# Patient Record
Sex: MALE | Race: WHITE | NOT HISPANIC OR LATINO | URBAN - METROPOLITAN AREA
[De-identification: names, ages, dates, MRNs, and addresses within clinical notes are randomized per-mention and may not be internally consistent; named-entity substitution may affect disease eponyms.]

---

## 2024-09-19 ENCOUNTER — EMERGENCY (EMERGENCY)
Facility: HOSPITAL | Age: 74
LOS: 1 days | Discharge: HOME | End: 2024-09-19
Attending: EMERGENCY MEDICINE | Admitting: EMERGENCY MEDICINE
Payer: MEDICARE

## 2024-09-19 VITALS
OXYGEN SATURATION: 95 % | TEMPERATURE: 98 F | RESPIRATION RATE: 16 BRPM | DIASTOLIC BLOOD PRESSURE: 64 MMHG | WEIGHT: 169.98 LBS | SYSTOLIC BLOOD PRESSURE: 121 MMHG | HEART RATE: 80 BPM | HEIGHT: 68 IN

## 2024-09-19 VITALS — HEART RATE: 57 BPM

## 2024-09-19 LAB
ALBUMIN SERPL ELPH-MCNC: 3.7 G/DL — SIGNIFICANT CHANGE UP (ref 3.4–5)
ALP SERPL-CCNC: 65 U/L — SIGNIFICANT CHANGE UP (ref 40–120)
ALT FLD-CCNC: 48 U/L — HIGH (ref 12–42)
ANION GAP SERPL CALC-SCNC: 9 MMOL/L — SIGNIFICANT CHANGE UP (ref 9–16)
APTT BLD: 29.5 SEC — SIGNIFICANT CHANGE UP (ref 24.5–35.6)
AST SERPL-CCNC: 29 U/L — SIGNIFICANT CHANGE UP (ref 15–37)
BASOPHILS # BLD AUTO: 0.04 K/UL — SIGNIFICANT CHANGE UP (ref 0–0.2)
BASOPHILS NFR BLD AUTO: 0.7 % — SIGNIFICANT CHANGE UP (ref 0–2)
BILIRUB SERPL-MCNC: 0.7 MG/DL — SIGNIFICANT CHANGE UP (ref 0.2–1.2)
BUN SERPL-MCNC: 37 MG/DL — HIGH (ref 7–23)
CALCIUM SERPL-MCNC: 9.6 MG/DL — SIGNIFICANT CHANGE UP (ref 8.5–10.5)
CHLORIDE SERPL-SCNC: 105 MMOL/L — SIGNIFICANT CHANGE UP (ref 96–108)
CO2 SERPL-SCNC: 28 MMOL/L — SIGNIFICANT CHANGE UP (ref 22–31)
CREAT SERPL-MCNC: 1.22 MG/DL — SIGNIFICANT CHANGE UP (ref 0.5–1.3)
EGFR: 63 ML/MIN/1.73M2 — SIGNIFICANT CHANGE UP
EOSINOPHIL # BLD AUTO: 0.27 K/UL — SIGNIFICANT CHANGE UP (ref 0–0.5)
EOSINOPHIL NFR BLD AUTO: 4.7 % — SIGNIFICANT CHANGE UP (ref 0–6)
GLUCOSE SERPL-MCNC: 115 MG/DL — HIGH (ref 70–99)
HCT VFR BLD CALC: 38.3 % — LOW (ref 39–50)
HGB BLD-MCNC: 13.1 G/DL — SIGNIFICANT CHANGE UP (ref 13–17)
IMM GRANULOCYTES NFR BLD AUTO: 0.2 % — SIGNIFICANT CHANGE UP (ref 0–0.9)
INR BLD: 1.02 — SIGNIFICANT CHANGE UP (ref 0.85–1.18)
LYMPHOCYTES # BLD AUTO: 1.9 K/UL — SIGNIFICANT CHANGE UP (ref 1–3.3)
LYMPHOCYTES # BLD AUTO: 33.1 % — SIGNIFICANT CHANGE UP (ref 13–44)
MCHC RBC-ENTMCNC: 34.2 GM/DL — SIGNIFICANT CHANGE UP (ref 32–36)
MCHC RBC-ENTMCNC: 34.2 PG — HIGH (ref 27–34)
MCV RBC AUTO: 100 FL — SIGNIFICANT CHANGE UP (ref 80–100)
MONOCYTES # BLD AUTO: 0.4 K/UL — SIGNIFICANT CHANGE UP (ref 0–0.9)
MONOCYTES NFR BLD AUTO: 7 % — SIGNIFICANT CHANGE UP (ref 2–14)
NEUTROPHILS # BLD AUTO: 3.12 K/UL — SIGNIFICANT CHANGE UP (ref 1.8–7.4)
NEUTROPHILS NFR BLD AUTO: 54.3 % — SIGNIFICANT CHANGE UP (ref 43–77)
NRBC # BLD: 0 /100 WBCS — SIGNIFICANT CHANGE UP (ref 0–0)
PLATELET # BLD AUTO: 251 K/UL — SIGNIFICANT CHANGE UP (ref 150–400)
POTASSIUM SERPL-MCNC: 3.9 MMOL/L — SIGNIFICANT CHANGE UP (ref 3.5–5.3)
POTASSIUM SERPL-SCNC: 3.9 MMOL/L — SIGNIFICANT CHANGE UP (ref 3.5–5.3)
PROT SERPL-MCNC: 7.3 G/DL — SIGNIFICANT CHANGE UP (ref 6.4–8.2)
PROTHROM AB SERPL-ACNC: 11.5 SEC — SIGNIFICANT CHANGE UP (ref 9.5–13)
RBC # BLD: 3.83 M/UL — LOW (ref 4.2–5.8)
RBC # FLD: 12.6 % — SIGNIFICANT CHANGE UP (ref 10.3–14.5)
SODIUM SERPL-SCNC: 142 MMOL/L — SIGNIFICANT CHANGE UP (ref 132–145)
TROPONIN I, HIGH SENSITIVITY RESULT: 5.7 NG/L — SIGNIFICANT CHANGE UP
WBC # BLD: 5.74 K/UL — SIGNIFICANT CHANGE UP (ref 3.8–10.5)
WBC # FLD AUTO: 5.74 K/UL — SIGNIFICANT CHANGE UP (ref 3.8–10.5)

## 2024-09-19 PROCEDURE — 0042T: CPT | Mod: MC

## 2024-09-19 PROCEDURE — 70496 CT ANGIOGRAPHY HEAD: CPT | Mod: 26,MC

## 2024-09-19 PROCEDURE — 70498 CT ANGIOGRAPHY NECK: CPT | Mod: 26,MC

## 2024-09-19 PROCEDURE — 99291 CRITICAL CARE FIRST HOUR: CPT

## 2024-09-19 PROCEDURE — 71045 X-RAY EXAM CHEST 1 VIEW: CPT | Mod: 26

## 2024-09-19 PROCEDURE — 70450 CT HEAD/BRAIN W/O DYE: CPT | Mod: 26,59,MC

## 2024-09-19 RX ORDER — ASPIRIN 81 MG
324 TABLET, DELAYED RELEASE (ENTERIC COATED) ORAL ONCE
Refills: 0 | Status: COMPLETED | OUTPATIENT
Start: 2024-09-19 | End: 2024-09-19

## 2024-09-19 RX ADMIN — Medication 324 MILLIGRAM(S): at 15:22

## 2024-09-19 NOTE — ED PROVIDER NOTE - PHYSICAL EXAMINATION
VSS in NAD non toxic appearing   NCAT EOMI PERRL OP clear  heart RRR no murmur   lungs CTA no wheezing no rales no rhonchi   abd soft NT ND no CVAT no guarding no rebound   normal neuro exam CN I-XII grossly intact, no groos motor or sensory deficits  no peripheral c/c/e  NIHSS 0

## 2024-09-19 NOTE — ED ADULT NURSE NOTE - NSFALLUNIVINTERV_ED_ALL_ED
Bed/Stretcher in lowest position, wheels locked, appropriate side rails in place/Call bell, personal items and telephone in reach/Instruct patient to call for assistance before getting out of bed/chair/stretcher/Non-slip footwear applied when patient is off stretcher/[place] to call system/Physically safe environment - no spills, clutter or unnecessary equipment/Purposeful proactive rounding/Room/bathroom lighting operational, light cord in reach

## 2024-09-19 NOTE — ED ADULT TRIAGE NOTE - CHIEF COMPLAINT QUOTE
Pt states approx 1 hour ago started with numbness to entire left side of body. No HA, no vision changes, no focal deficits. No weakness. No AC use.

## 2024-09-19 NOTE — ED PROVIDER NOTE - CARE PROVIDER_API CALL
Neal Kumar  Neurology  [address] [address]  Phone: [phone]  Fax: [phone]  Follow Up Time: 4-6 Days

## 2024-09-19 NOTE — ED ADULT NURSE NOTE - BEFAST SPEECH PHRASE
[FreeTextEntry1] : Sarcoidosis stable\par Cough resolved with treatment for laryngeal pharyngeal reflux.\par ? Polycythemia\par R/O CHEL
Yes

## 2024-09-19 NOTE — ED PROVIDER NOTE - CLINICAL SUMMARY MEDICAL DECISION MAKING FREE TEXT BOX
73-year-old male  Presented to the ED with complaint of numbness and tingling of the left upper extremity and left lower extremity, sudden onset approximately 1 hour prior to arrival in the ED.  On arrival to triage, patient was evaluated by me and stroke code was initiated.  Patient had an full stroke evaluation with CT, CTA and perfusion scan with no evidence of any stroke.  I consulted stroke neurology and spoke with Dr. Kumar, recommended that if patient's symptoms are improving he can be admitted to TIA observation.  I spoke with the patient, reevaluated him and he states that his symptoms are completely resolved.  He is ambulatory with steady gait, his neuroexam is normal and his NIH stroke scale 0.  His symptoms are more likely to be due to peripheral cause of neuropathy than a central cause. He was counseled extensively regarding risk benefits and alternatives on appears to be reliable.  His partner was with him, they live in [place] and they will follow-up with neurology outpatient.  They verbalized understanding and need to go to the ER, the high risk of subsequent stroke in the setting of possible TIA about, stable for DC home.

## 2024-09-19 NOTE — ED PROVIDER NOTE - NSFOLLOWUPINSTRUCTIONS_ED_ALL_ED_FT
What is peripheral neuropathy?  Peripheral neuropathy is a type of nerve damage.    The nervous system has 2 main parts. The "central nervous system" is made up of the brain and spinal cord. The "peripheral nervous system" is made up of the nerves in the body. The peripheral nerves pass signals between the central nervous system and the rest of the body.    The peripheral nervous system includes different types of nerves:    ?Motor – This means related to movement. Your motor nerves help your body move and keep its balance.    ?Sensory – This means related to the senses. Your sensory nerves help you touch, taste, smell, see, and hear.    ?Autonomic – Your autonomic nerves control body functions that you do not have to think about. For example, they control your heartbeat, breathing, digestion, and blood pressure.    Peripheral neuropathy is when 1 or more of these nerves is damaged. This causes symptoms.    What causes peripheral neuropathy?  Peripheral neuropathy can have many different causes. These include some health problems and certain medicines. Sometimes, the cause is not known.    Some possible causes include:    ?Diabetes – This is a very common cause of peripheral neuropathy.    ?Injury to or pressure on the nerves    ?Inherited conditions – These are medical conditions that run in families.    ?Certain medicines – Some medicines, such as chemotherapy to treat cancer, can cause peripheral neuropathy as a side effect.    ?Autoimmune diseases – These are diseases in which the body's infection-fighting system attacks healthy tissue instead of infections.    ?Infections    ?Vitamin deficiency – This is when you are missing certain important vitamins from your diet.    ?Certain poisons and toxins    What are the symptoms of peripheral neuropathy?  People can have different symptoms. The symptoms depend on which nerves are damaged.    Motor symptoms can include:    ?Weakness – Sometimes, weakness can make you clumsy. For example, you might have trouble running or climbing up stairs. Or you might have trouble with tasks like writing, buttoning clothes, opening jars, or tying your shoes.    ?Trouble balancing    Sensory symptoms can include:    ?Numbness    ?Tingling or feeling "pins and needles"    ?Pain or burning    Autonomic symptoms can include:    ?Bowel and bladder problems – For example, a person might have loose or hard bowel movements, leak urine, or be unable to urinate.    ?Trouble swallowing    ?Dizziness    ?Sweating a lot    ?Problems with sex    Will I need tests?  Probably. If you have symptoms of nerve damage, your doctor or nurse will talk to you about your symptoms and do an exam. You will probably also need tests to check how well your nerves are working and try to find the cause of your nerve damage.    Tests might include:    ?Tests to check how your nerves respond to vibrations, pain, or changes in temperature    ?Electromyogram ("EMG") – This test checks how well the nerves in your muscles are working when the muscles are at rest and when you squeeze or tense them.    ?Nerve conduction studies ("NCS") – This test checks how fast your nerves can send signals.    ?Lab tests    ?Lumbar puncture (also called "spinal tap") – This involves testing a small sample of fluid from around the spinal cord.    ?Imaging tests, such as a CT scan or MRI – Imaging tests create pictures of the inside of the body.    ?Skin or nerve biopsy – This is when doctors take a small sample of tissue from the body and then look at it under a microscope. They might take a sample of skin tissue, nerve tissue, or both.    How is peripheral neuropathy treated?  Peripheral neuropathy is usually permanent. But there might be ways to keep it from getting worse.    Treatment depends on what is causing your nerve damage and which symptoms you have. It might include:    ?Treatment for the problem that is causing nerve damage – If your doctor knows the cause, they will try to treat that health problem or condition. For example, if your nerve damage is caused by diabetes, it can help to keep your blood sugar under control. If it is caused by a problem with your immune system, you might need medicine to treat that condition.    ?Treatment to relieve symptoms – This will depend on your symptoms and what is causing them. Treatments might include:    •Taking pain-relieving medicines – For mild symptoms, you might be able to take over-the-counter medicines. For more severe symptoms, your doctor might prescribe a stronger medicine.    •Pain-relief medicines that go on the skin – These include creams or patches.    •Taking anti-seizure or antidepressant medicines – Both of these medicines can help reduce pain.    •Physical therapy – If you have weakness or numbness, physical therapy can help you move more easily.    •Using "assistive devices" – These are devices such as hand and foot braces, a cane, a walker, or a wheelchair.    •TENS therapy – With TENS therapy, small patches called "electrodes" are placed on your skin. A machine delivers a very low electric current to the skin through the patches. TENS therapy can help reduce pain.    •Treatments for problems with eating, sex, or bladder and bowel function    Sometimes, pain from peripheral neuropathy will go away on its own without treatment.    Is there anything I can do on my own?  Yes. There are things you can do that might help to prevent peripheral neuropathy or keep it from getting worse. You should:    ?Limit alcohol – Drinking too much alcohol can make peripheral neuropathy worse. Talk to your doctor or nurse about how much alcohol you can safely drink.    ?Stop smoking, if you smoke – Smoking can reduce how much blood flows to the peripheral nerves. This can make your symptoms worse. If you are having trouble quitting, your doctor or nurse can help.    ?Keep your diabetes under control – If you have diabetes, managing your blood sugar is one of the best things that you can do to prevent or improve peripheral neuropathy.    ?Avoid burns or cuts – Peripheral neuropathy can make it harder to feel temperature changes or pain. Be very careful when handling hot objects, using hot water, or using anything sharp that could cause injury.    ?Avoid falls – Weakness and trouble balancing can increase your risk of falling. Get rid of things in your home that might cause you to trip. This might include furniture, electrical cords, clutter, and loose rugs. Keep your home well-lit so that you can easily see where you're going. Avoid storing things in high places so you don't have to climb or reach.    ?Check your feet regularly – If you have peripheral neuropathy in your feet, it is important that you check them often. Look for sores, cracks, or signs of infection. These can lead to serious problems, especially if you have diabetes. If you have trouble seeing your feet, ask someone else to check them, or go to your doctor or nurse.    Living with peripheral neuropathy can be hard. Sometimes, this can lead to depression and anxiety. Talk to your doctor or nurse if you feel depressed or anxious. Getting treatment for these problems can make it easier to cope.    When should I call the doctor?  Call for advice if you have:    ?A fever of 100.4°F (38°C) or higher, chills, or a wound that will not heal    ?Swelling, redness, warmth around a wound, a foul smell coming from a wound, or yellowish, greenish, or bloody discharge    ?New numbness or weakness in a foot or leg    ?Worsening symptoms    ?Dizziness or lightheadedness    ?Double vision or confusion    ?Problems with your blood sugar, if you have diabetes    ?Trouble breathing    ?Chest pain or discomfort

## 2024-09-19 NOTE — ED PROVIDER NOTE - CRITICAL CARE ATTENDING CONTRIBUTION TO CARE
The patient was seen immediately upon arrival due to a high probability of imminent or life-threatening deterioration secondary to STROKE, which required my direct attention, intervention, and personal management at the bedside. I have personally provided critical care time exclusive of time spent on separately billable procedures. Time includes review of laboratory data, radiology results, discussion with consultants, discussion with the patient's family, and monitoring for potential decompensation.

## 2024-09-19 NOTE — ED PROVIDER NOTE - PATIENT PORTAL LINK FT
You can access the FollowMyHealth Patient Portal offered by [facility] by registering at the following website: [email or web address]. By joining [facility]’s FollowMyHealth portal, you will also be able to view your health information using other applications (apps) compatible with our system.

## 2024-09-19 NOTE — ED ADULT NURSE NOTE - OBJECTIVE STATEMENT
Patient is a 74 y/o M c/o numbness. patient reports numbness to left arm that began around 1130 am this morning while riding the train. patient reports numbness has since resided since coming to ed. stroke code activated

## 2024-09-19 NOTE — ED PROVIDER NOTE - OBJECTIVE STATEMENT
73-year-old male  Presented to the ED with complaint of numbness and tingling of the left upper extremity and left lower extremity, sudden onset approximately 1 hour prior to arrival in the ED.  On arrival to triage, patient was evaluated by me and stroke code was initiated.  Patient had an full stroke evaluation with CT, CTA and perfusion scan with no evidence of any stroke.  I consulted stroke neurology and spoke with Dr. Kumar, recommended that if patient's symptoms are improving he can be admitted to TIA observation.  I spoke with the patient, reevaluated him and he states that his symptoms are completely resolved.  He is ambulatory with steady gait, his neuroexam is normal and his NIH stroke scale 0.  He was counseled extensively regarding risk benefits and alternatives on appears to be reliable.  His partner was with him, they live in [place] and they will follow-up with neurology outpatient.  They verbalized understanding and need to go to the ER, the high risk of subsequent stroke in the setting of possible TIA about, stable for DC home.

## 2024-09-23 DIAGNOSIS — R20.0 ANESTHESIA OF SKIN: ICD-10-CM

## 2024-09-23 DIAGNOSIS — Z88.0 ALLERGY STATUS TO PENICILLIN: ICD-10-CM

## 2024-09-23 DIAGNOSIS — I44.0 ATRIOVENTRICULAR BLOCK, FIRST DEGREE: ICD-10-CM

## 2024-09-23 DIAGNOSIS — G62.9 POLYNEUROPATHY, UNSPECIFIED: ICD-10-CM

## 2024-09-24 ENCOUNTER — NON-APPOINTMENT (OUTPATIENT)
Age: 74
End: 2024-09-24

## 2024-09-24 PROBLEM — Z00.00 ENCOUNTER FOR PREVENTIVE HEALTH EXAMINATION: Status: ACTIVE | Noted: 2024-09-24
